# Patient Record
Sex: MALE | URBAN - METROPOLITAN AREA
[De-identification: names, ages, dates, MRNs, and addresses within clinical notes are randomized per-mention and may not be internally consistent; named-entity substitution may affect disease eponyms.]

---

## 2017-11-14 ENCOUNTER — TELEPHONE (OUTPATIENT)
Dept: RADIATION ONCOLOGY | Age: 77
End: 2017-11-14

## 2017-11-14 RX ORDER — ATORVASTATIN CALCIUM 80 MG/1
80 TABLET, FILM COATED ORAL DAILY
COMMUNITY

## 2017-11-14 RX ORDER — AZITHROMYCIN 250 MG/1
500 TABLET, FILM COATED ORAL DAILY
COMMUNITY

## 2017-11-14 RX ORDER — DEXAMETHASONE 4 MG/1
4 TABLET ORAL 2 TIMES DAILY WITH MEALS
COMMUNITY

## 2017-11-14 RX ORDER — GABAPENTIN 300 MG/1
300 CAPSULE ORAL 3 TIMES DAILY
COMMUNITY

## 2017-11-14 RX ORDER — DOXEPIN HYDROCHLORIDE 25 MG/1
75 CAPSULE ORAL NIGHTLY
COMMUNITY

## 2017-11-14 RX ORDER — LEVOTHYROXINE SODIUM 0.1 MG/1
100 TABLET ORAL DAILY
COMMUNITY

## 2017-11-14 RX ORDER — ALBUTEROL SULFATE 90 UG/1
2 AEROSOL, METERED RESPIRATORY (INHALATION) EVERY 6 HOURS PRN
COMMUNITY

## 2017-11-14 RX ORDER — TIZANIDINE 4 MG/1
4 TABLET ORAL EVERY 6 HOURS PRN
COMMUNITY

## 2017-11-14 RX ORDER — PANTOPRAZOLE SODIUM 40 MG/1
40 TABLET, DELAYED RELEASE ORAL DAILY
COMMUNITY

## 2017-11-14 RX ORDER — GLIPIZIDE 5 MG/1
5 TABLET ORAL
COMMUNITY

## 2017-11-14 RX ORDER — POTASSIUM CITRATE 10 MEQ/1
10 TABLET, EXTENDED RELEASE ORAL
COMMUNITY

## 2017-11-14 RX ORDER — HYDROCODONE BITARTRATE AND ACETAMINOPHEN 5; 325 MG/1; MG/1
1 TABLET ORAL EVERY 6 HOURS PRN
COMMUNITY

## 2017-11-14 RX ORDER — TAMSULOSIN HYDROCHLORIDE 0.4 MG/1
0.4 CAPSULE ORAL DAILY
COMMUNITY

## 2017-11-14 RX ORDER — ACETAMINOPHEN 325 MG/1
650 TABLET ORAL EVERY 6 HOURS PRN
COMMUNITY

## 2017-11-14 NOTE — TELEPHONE ENCOUNTER
Called patient to explain that his insurance approved his Gamma Knife treatment. Also reviewed the following:    INSTRUCTIONS FOR THE DAY OF GAMMA KNIFE PROCEDURE AT THE [facility]    1. Arrive at 6:00 a.m. to register. 2.  DO NOT eat or drink anything after midnight the night before your procedure. 3.  Take all of your usual morning medications the day of the procedure with just a sip of water. 4.  If you are on any blood thinners (Coumadin, Xarelto, Aspirin, Lovenox), you MAY TAKE those medication. There is no need to stop these medications for your procedure. 5.  If you need pain medication during the night before or the morning of your procedure, you may take them with a sip of water. 6.  Bring a current list of all of your medications with you. 7.  Do not wear any make-up. 8.  Wear comfortable, loose-fitting clothing. 9.  Do not wear jewelry, belts, or any clothing that contains metal.  10.  Visitors will be limited to 2 per patient. YOU MUST HAVE SOMEONE DRIVE YOU HOME AFTER YOUR PROCEDURE. Reviewed all pre-procedure instructions with patient via telephone. Answered all questions.     Ailin Menjivar

## 2017-11-14 NOTE — TELEPHONE ENCOUNTER
Called and spoke to patients daughter. Jacqueline was called to [address] [place] [address] in [place], [place]. Instructions given on medications and when to take. She verbalized understanding.     Rosabel Buerger, RN

## 2017-11-15 ENCOUNTER — HOSPITAL ENCOUNTER (OUTPATIENT)
Dept: OTHER | Age: 77
Discharge: HOME | End: 2017-11-15
Attending: NEUROLOGICAL SURGERY | Admitting: NEUROLOGICAL SURGERY

## 2017-11-15 ENCOUNTER — CLINICAL DOCUMENTATION (OUTPATIENT)
Dept: RADIATION ONCOLOGY | Age: 77
End: 2017-11-15

## 2017-11-15 VITALS
WEIGHT: 157 LBS | BODY MASS INDEX: 23.79 KG/M2 | SYSTOLIC BLOOD PRESSURE: 133 MMHG | RESPIRATION RATE: 16 BRPM | HEART RATE: 67 BPM | DIASTOLIC BLOOD PRESSURE: 69 MMHG | HEIGHT: 68 IN | OXYGEN SATURATION: 98 % | TEMPERATURE: 97.5 F

## 2017-11-15 DIAGNOSIS — C79.31 BRAIN METASTASES (HCC): Primary | ICD-10-CM

## 2017-11-15 DIAGNOSIS — C79.49 SECONDARY MALIGNANT NEOPLASM OF BRAIN AND SPINAL CORD (HCC): ICD-10-CM

## 2017-11-15 DIAGNOSIS — C79.31 SECONDARY MALIGNANT NEOPLASM OF BRAIN AND SPINAL CORD (HCC): ICD-10-CM

## 2017-11-15 RX ORDER — POLYETHYLENE GLYCOL 3350 17 G/17G
17 POWDER, FOR SOLUTION ORAL PRN
COMMUNITY

## 2017-11-15 RX ORDER — DIPHENHYDRAMINE HCL 25 MG
25 CAPSULE ORAL EVERY 6 HOURS PRN
COMMUNITY

## 2017-11-15 RX ORDER — OYSTER SHELL CALCIUM WITH VITAMIN D 500; 200 MG/1; [IU]/1
1 TABLET, FILM COATED ORAL DAILY
COMMUNITY

## 2017-11-15 RX ORDER — DEXAMETHASONE SODIUM PHOSPHATE 4 MG/ML
4 INJECTION, SOLUTION INTRA-ARTICULAR; INTRALESIONAL; INTRAMUSCULAR; INTRAVENOUS; SOFT TISSUE ONCE
Status: COMPLETED | OUTPATIENT
Start: 2017-11-15 | End: 2017-11-15

## 2017-11-15 RX ORDER — ONDANSETRON 2 MG/ML
4 INJECTION INTRAMUSCULAR; INTRAVENOUS ONCE
Status: COMPLETED | OUTPATIENT
Start: 2017-11-15 | End: 2017-11-15

## 2017-11-15 RX ORDER — LIDOCAINE HYDROCHLORIDE 10 MG/ML
30 INJECTION, SOLUTION EPIDURAL; INFILTRATION; INTRACAUDAL; PERINEURAL ONCE
Status: COMPLETED | OUTPATIENT
Start: 2017-11-15 | End: 2017-11-15

## 2017-11-15 RX ORDER — BUPIVACAINE HYDROCHLORIDE 5 MG/ML
30 INJECTION, SOLUTION EPIDURAL; INTRACAUDAL ONCE
Status: COMPLETED | OUTPATIENT
Start: 2017-11-15 | End: 2017-11-15

## 2017-11-15 RX ORDER — GUAIFENESIN 600 MG/1
1200 TABLET, EXTENDED RELEASE ORAL 2 TIMES DAILY
COMMUNITY

## 2017-11-15 RX ORDER — 0.9 % SODIUM CHLORIDE 0.9 %
500 INTRAVENOUS SOLUTION INTRAVENOUS ONCE
Status: COMPLETED | OUTPATIENT
Start: 2017-11-15 | End: 2017-11-15

## 2017-11-15 RX ORDER — LEVETIRACETAM 500 MG/1
500 TABLET ORAL 2 TIMES DAILY
COMMUNITY

## 2017-11-15 RX ADMIN — BUPIVACAINE HYDROCHLORIDE 150 MG: 5 INJECTION, SOLUTION EPIDURAL; INTRACAUDAL at 07:53

## 2017-11-15 RX ADMIN — LIDOCAINE HYDROCHLORIDE 30 ML: 10 INJECTION, SOLUTION EPIDURAL; INFILTRATION; INTRACAUDAL; PERINEURAL at 07:53

## 2017-11-15 RX ADMIN — ONDANSETRON 4 MG: 2 INJECTION INTRAMUSCULAR; INTRAVENOUS at 07:31

## 2017-11-15 RX ADMIN — DEXAMETHASONE SODIUM PHOSPHATE 4 MG: 4 INJECTION, SOLUTION INTRA-ARTICULAR; INTRALESIONAL; INTRAMUSCULAR; INTRAVENOUS; SOFT TISSUE at 12:35

## 2017-11-15 RX ADMIN — Medication 500 ML: at 06:37

## 2017-11-15 ASSESSMENT — PAIN SCALES - GENERAL: PAINLEVEL_OUTOF10: 10

## 2017-11-15 NOTE — ANESTHESIA POST-OP
Anesthesia Post-op Note    Patient: Mariah Mason  MRN: [id number]  YOB: 1940  Date of evaluation: 11/15/2017  Time:  8:30 AM     Procedure(s) Performed:     Last Vitals: /61   Pulse 54   Temp 97.8 °F (36.6 °C) (Oral)   Resp 10   Ht 5' 8\" (1.727 m)   Wt 157 lb (71.2 kg)   SpO2 100%   BMI 23.87 kg/m²     Michelle Phase I:      Michelle Phase II:      Anesthesia Post Evaluation    Final anesthesia type: MAC  Patient location during evaluation: PACU  Patient participation: complete - patient participated  Level of consciousness: awake and alert  Pain score: 0  Airway patency: patent  Nausea & Vomiting: no vomiting  Complications: no  Cardiovascular status: blood pressure returned to baseline  Respiratory status: acceptable  Hydration status: euvolemic        Juany Bautista MD  8:30 AM

## 2017-11-15 NOTE — PROGRESS NOTES
Patient brought to [facility] suite and placed on treatment couch. Sequential Compression Devices placed on BLE's per [address] VTE Protocol. AV monitoring in place and verified verbally with patient from console. Continuous SpO2 and pulse monitor visible and monitored by this RN.     Yadira Betts RN

## 2017-11-15 NOTE — ANESTHESIA PRE-OP
(UROCIT-K 10) 10 MEQ (1080 MG) extended release tablet Take 10 mEq by mouth 3 times daily (with meals)      tamsulosin (FLOMAX) 0.4 MG capsule Take 0.4 mg by mouth daily      tiZANidine (ZANAFLEX) 4 MG tablet Take 4 mg by mouth every 6 hours as needed      polyethylene glycol (GLYCOLAX) powder Take 17 g by mouth as needed      diphenhydrAMINE (BENADRYL) 25 MG capsule Take 25 mg by mouth every 6 hours as needed for Itching      acetaminophen (TYLENOL) 325 MG tablet Take 650 mg by mouth every 6 hours as needed for Pain       Current Facility-Administered Medications   Medication Dose Route Frequency Provider Last Rate Last Dose    0.9 % sodium chloride bolus  500 mL Intravenous Once Frank Ott  mL/hr at 11/15/17 0637 500 mL at 11/15/17 0637    bupivacaine (PF) (MARCAINE) 0.5 % injection 150 mg  30 mL Intradermal Once Frank Ott MD        Dexamethasone Sodium Phosphate injection 4 mg  4 mg Intravenous Once Frank Ott MD        lidocaine PF 1 % injection 30 mL  30 mL Intradermal Once Frank Ott MD        ondansetron [facility]) injection 4 mg  4 mg Intravenous Once Frank Ott MD           Allergies: Allergies   Allergen Reactions    Tramadol Shortness Of Breath    Ultram [Tramadol Hcl] Shortness Of Breath    Codeine Nausea Only    Iodine Other (See Comments)     Not allergic to iodine but not allowed to have IVP dye due to effect on kidneys  Kidney function    Morphine Nausea And Vomiting       Problem List:  There is no problem list on file for this patient.       Past Medical History:        Diagnosis Date    Aortic stenosis     ASHD (arteriosclerotic heart disease)     Brain metastases (HCC)     Carotid artery disease (HCC)     Diabetes mellitus ([facility] [address]     DVT (deep venous thrombosis) (HCC)     Emphysema (subcutaneous) (surgical) resulting from a procedure     GERD (gastroesophageal reflux disease)     Heart attack     Hypertension     Hypothyroidism     Kidney disease     Lung cancer [facility])        Past Surgical History:        Procedure Laterality Date    ABDOMINAL AORTIC ANEURYSM REPAIR  2000    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    APPENDECTOMY  1960    CAROTID ENDARTERECTOMY  2012    CHOLECYSTECTOMY  2014    CORONARY ANGIOPLASTY WITH STENT PLACEMENT  10/13/2013    LOBECTOMY Left 06/2016    JENN    LUMBAR [facility]    L4-L5    SPINE SURGERY  2004    spinal stenosis    STOMACH SURGERY  1962    Ulcer surgery       Social History:    Social History   Substance Use Topics    Smoking status: Not on file    Smokeless tobacco: Not on file    Alcohol use Not on file                                Counseling given: Not Answered      Vital Signs (Current):   Vitals:    11/15/17 0601   BP: 104/67   Pulse: 74   Resp: 10   Temp: 97.8 °F (36.6 °C)   TempSrc: Oral   SpO2: 98%   Weight: 157 lb (71.2 kg)   Height: 5' 8\" (1.727 m)                                              BP Readings from Last 3 Encounters:   11/15/17 104/67       NPO Status:                                                                                 BMI:   Wt Readings from Last 3 Encounters:   11/15/17 157 lb (71.2 kg)     Body mass index is 23.87 kg/m².     Anesthesia Evaluation   no history of anesthetic complications:   Airway: Mallampati: I  TM distance: >3 FB   Neck ROM: full  Mouth opening: > = 3 FB Dental:      Comment: edentulous    Pulmonary:                           ROS comment: Hx of COPD on home O2  Denies Asthma, Smoking   Cardiovascular:    (+) hypertension (controlled):,                ROS comment: Hx of CAD s/p MI  Denies CP, SOA with moderate exercise  S/P AVR     Neuro/Psych:                  Comments: As per procedure  Hx of TIA (no recurrence) GI/Hepatic/Renal:   (+) GERD (controlled):, renal disease (CRI):,      (-) liver disease       Endo/Other:    (+) Type II DM (insulin dependent), , hypothyroidism (on synthroid)::., .                 Abdominal:           Vascular: negative vascular ROS. Anesthesia Plan      MAC     ASA 3       Induction: intravenous. Anesthetic plan and risks discussed with patient.                       Corey Clay MD   11/15/2017

## 2017-11-15 NOTE — PROGRESS NOTES
[facility]    PATIENT NAME:   Laney Gomez  MR #:  [id number]  YOB: 1940  ACCOUNT #:    SURGEON:selwyn   ADMIT DATE:  (Not on file)  SERVICE: Rad onc   DICTATED BY: Oneida Grider MD  SURGERY DATE:  11/15/2017           OPERATIVE REPORT       PREOPERATIVE DIAGNOSIS:     1. Brain mets      POSTOPERATIVE DIAGNOSIS:     1. Brain mets    PROCEDURE(S) PERFORMED:    1. Moderate sedation (45 minutes)   2. Placement of stereotactic head frame   3. Gamma Knife radiosurgery for 2 brain metastases    SURGEON: selwyn   RADIATION ONCOLOGIST: bertram    ANESTHESIA: Moderate sedation: Fentanyl and Versed were administered IV by the nurse under the direct supervision of the surgeon. Monitored nursing care and medication records are available on the chart. Total duration of moderate sedation was 45 minutes    COMPLICATIONS: None     SPECIMENS:  Not applicable    INDICATIONS:  We recommended Gamma Knife radiosurgery. The patient was aware of the potential benefits in terms of tumor control and the possible risks including (but not limited to):  pin site bleeding/swelling/infection, brain swelling, seizures, radiation injury to the brain, temporary or permanent neurological symptoms, and/or secondary tumor formation. PERFORMANCE STATUS: 80     SYSTEMIC DISEASE: Progressive     DETAILS OF PROCEDURE:  The patient was given IV sedation and monitored continuously during the procedure. Each of four pin sites were cleaned with Chloraprep and injected with a mixture of Lidocaine 1%, Marcaine 0.5%, and sodium bicarbonate. The stereotactic head frame was secured with titanium screws. The patient underwent a stereotactic MRI scan with contrast. These images were sent over the network to the [address] [place] [address]. The targets and critical structures were contoured.   An optimal treatment plan was developed by the neurosurgeon and radiation oncologist.The patient then underwent Gamma Knife radiosurgery using the

## 2017-11-15 NOTE — PROGRESS NOTES
Gamma Knife Frame Placement Time Out    1. Patient states name and birthdate correctly? Yes    2. Is this the correct patient? Yes    3. Procedure listed on consent:  Stereotactic Radiosurgery, Gamma Knife    4. Is this the correct procedure? Yes    5. Are the consents signed? Yes    6. Does the patient have only one benign target or lesion? No     -If yes, what side are we treating:  N/A     -Is this the correct side? N/A     -Is the site marked for laterality? N/A    7. Have films been reviewed today? Yes    8. Has the neurosurgeon reviewed the [facility] RN Pre-Procedure Checklist?  Yes    9. Are all present in agreement? Yes    Those present for time out:  BIBI Mclaughlin CRNA, RN

## 2017-11-15 NOTE — PROGRESS NOTES
After Gamma Knife procedure, the G-frame was removed by Dr. Aldair Douglas and fixation pin sites were observed for bleeding. Minimal bleeding noted at left posterior pin site. Pressure applied and bleeding stopped. Pin sites were cleaned with alcohol and povidone-iodine. Dr. Aldair Douglas then applied sutures to all pin sites. Patient met Phase II discharge criteria. Baseline neurological status unchanged. See discharge Michelle score and vitals. Discharge instructions were reviewed with patient and wife and daughter who verbalized understanding using teach back method. A written copy of the instructions along with a steroid taper calendar was also given. Patient has follow up appointments scheduled. Patient was accompanied to transportation by this RN.     Rosabel Buerger, RN

## 2017-11-15 NOTE — H&P
There have been no changes in the patient's condition since the history and physical.    Fab Cano.  Mathew Nobles MD

## 2017-11-28 ENCOUNTER — CLINICAL DOCUMENTATION (OUTPATIENT)
Dept: RADIATION ONCOLOGY | Age: 77
End: 2017-11-28

## 2017-11-28 NOTE — PROGRESS NOTES
Patient was seen for 2 week follow up to [facility]. His daughter and son accompanied him to the appointment. He is on home O2 and appears to be weak. He denies headache or new neurological symptoms. He reports that he is being treated for pneumonia and received a bad report from a CT scan yesterday. His daughter states that CT revealed lesions to the pancreas that are probably metastatic disease. She states that they are seeing Dr. Dori Duggan on Thursday of this week. Mr. Javier Marshall was in urgent care on Monday with abdominal pain and a blood sugar of 700 per daughter. He is being followed by his PCP and Med/Onc. Pin sites appear healed. Sutures were removed from pin sites x4. No redness, swelling, or drainage noted. He will be completing his steroid taper on Thursday. He was instructed to take Keppra 500mg this evening and beginning tomorrow take only in the evening for 7 days then stop. Follow appointments were reviewed and the patient and his family were encouraged to call with any questions or concerns.       Mona Levine RN